# Patient Record
Sex: MALE | Race: WHITE | RURAL
[De-identification: names, ages, dates, MRNs, and addresses within clinical notes are randomized per-mention and may not be internally consistent; named-entity substitution may affect disease eponyms.]

---

## 2023-11-06 ENCOUNTER — APPOINTMENT (OUTPATIENT)
Dept: GENERAL RADIOLOGY | Facility: HOSPITAL | Age: 88
End: 2023-11-06
Attending: EMERGENCY MEDICINE
Payer: OTHER GOVERNMENT

## 2023-11-06 ENCOUNTER — HOSPITAL ENCOUNTER (EMERGENCY)
Facility: HOSPITAL | Age: 88
Discharge: HOME | End: 2023-11-06
Attending: EMERGENCY MEDICINE
Payer: OTHER GOVERNMENT

## 2023-11-06 VITALS
TEMPERATURE: 98.5 F | DIASTOLIC BLOOD PRESSURE: 110 MMHG | HEART RATE: 62 BPM | OXYGEN SATURATION: 99 % | RESPIRATION RATE: 15 BRPM | WEIGHT: 180 LBS | HEIGHT: 69 IN | BODY MASS INDEX: 26.66 KG/M2 | SYSTOLIC BLOOD PRESSURE: 176 MMHG

## 2023-11-06 DIAGNOSIS — F03.90 DEMENTIA, UNSPECIFIED DEMENTIA SEVERITY, UNSPECIFIED DEMENTIA TYPE, UNSPECIFIED WHETHER BEHAVIORAL, PSYCHOTIC, OR MOOD DISTURBANCE OR ANXIETY (HCC): Primary | ICD-10-CM

## 2023-11-06 DIAGNOSIS — M17.12 ARTHRITIS OF LEFT KNEE: ICD-10-CM

## 2023-11-06 PROCEDURE — 96372 THER/PROPH/DIAG INJ SC/IM: CPT

## 2023-11-06 PROCEDURE — 73560 X-RAY EXAM OF KNEE 1 OR 2: CPT

## 2023-11-06 PROCEDURE — 99284 EMERGENCY DEPT VISIT MOD MDM: CPT

## 2023-11-06 PROCEDURE — 6360000002 HC RX W HCPCS: Performed by: EMERGENCY MEDICINE

## 2023-11-06 RX ORDER — KETOROLAC TROMETHAMINE 15 MG/ML
15 INJECTION, SOLUTION INTRAMUSCULAR; INTRAVENOUS ONCE
Status: COMPLETED | OUTPATIENT
Start: 2023-11-06 | End: 2023-11-06

## 2023-11-06 RX ADMIN — KETOROLAC TROMETHAMINE 15 MG: 15 INJECTION, SOLUTION INTRAMUSCULAR; INTRAVENOUS at 17:28

## 2023-11-06 ASSESSMENT — PATIENT HEALTH QUESTIONNAIRE - PHQ9
SUM OF ALL RESPONSES TO PHQ QUESTIONS 1-9: 0
1. LITTLE INTEREST OR PLEASURE IN DOING THINGS: 0
2. FEELING DOWN, DEPRESSED OR HOPELESS: 0
SUM OF ALL RESPONSES TO PHQ9 QUESTIONS 1 & 2: 0
SUM OF ALL RESPONSES TO PHQ QUESTIONS 1-9: 0

## 2023-11-06 ASSESSMENT — LIFESTYLE VARIABLES
HOW MANY STANDARD DRINKS CONTAINING ALCOHOL DO YOU HAVE ON A TYPICAL DAY: PATIENT DOES NOT DRINK
HOW OFTEN DO YOU HAVE A DRINK CONTAINING ALCOHOL: NEVER

## 2023-11-06 ASSESSMENT — PAIN SCALES - GENERAL: PAINLEVEL_OUTOF10: 3

## 2023-11-06 ASSESSMENT — PAIN DESCRIPTION - ORIENTATION: ORIENTATION: RIGHT;LEFT

## 2023-11-06 ASSESSMENT — PAIN - FUNCTIONAL ASSESSMENT: PAIN_FUNCTIONAL_ASSESSMENT: 0-10

## 2023-11-06 ASSESSMENT — PAIN DESCRIPTION - LOCATION: LOCATION: KNEE

## 2023-11-06 NOTE — ED NOTES
Spoke with patients son, Adelaida Carrel, over the phone. He states he acts as his current caretaker, but was not aware that the patient had called EMS until they arrived at their residence. Per the son, the patient is confused at baseline and \"has these spells\" of increased confusion and agitation at times. This afternoon the patient was upset with the son because he would not allow him to drive himself to the [facility] in [place], so the patient called someone to call an ambulance for him. Dr. Toya Day was made aware of the situation and also spoke with the son over the phone. Per Dr. Toya Day, the patient is going to be discharged, with the son coming to pick him up now.       Fco Harper [place]  11/06/23 2261

## 2023-11-06 NOTE — ED PROVIDER NOTES
[facility] ENCOUNTER        Pt Name: Kermit Brantley  MRN: [id number]  [address] [facility] 1934  Date of evaluation: 11/6/2023  Provider: Birgit Pryor MD  PCP: No primary care provider on file. Note Started: 4:29 PM EST 11/6/23    CHIEF COMPLAINT       Chief Complaint   Patient presents with    Eye Problem       HISTORY OF PRESENT ILLNESS: 1 or more Elements     History from : Patient    Limitations to history : None    Kermit Brantley is a 80 y.o. male who presents complaining that he had a left-sided stroke he does not know when but since that time the vision in his left eye has been bed and getting worse he also said that he had had surgery on that left eye and that he called the ambulance to take him back to the [place] to have this checked out he also complains of some left knee pain that he says has been bothering him ever since his stroke. Denies headache he complains of having speech but he speaks quite well around talking to them. Nursing Notes were all reviewed and agreed with or any disagreements were addressed in the HPI. REVIEW OF SYSTEMS :      Review of Systems     systems reviewed and negative except as in HPI/MDM    SURGICAL HISTORY     Past Surgical History:   Procedure Laterality Date    CARDIAC SURGERY      CORONARY ARTERY BYPASS GRAFT      3 vessel    HERNIA REPAIR         CURRENTMEDICATIONS       Previous Medications    No medications on file       ALLERGIES     Patient has no known allergies. FAMILYHISTORY     History reviewed. No pertinent family history.      SOCIAL HISTORY       Social History     Tobacco Use    Smoking status: Never    Smokeless tobacco: Never   Substance Use Topics    Alcohol use: Not Currently       SCREENINGS        [place] Coma Scale  Eye Opening: Spontaneous  Best Verbal Response: Oriented  Best Motor Response: Obeys commands  [place] Coma Scale Score: 15                CIWA Assessment  BP: (!)

## 2023-11-06 NOTE — ED NOTES
Patient reports he requested to be taken to [facility], but [facility] EMS unable to pass closest hospital.      Kayla Aggarwal, [address]  11/06/23 8107

## 2023-11-06 NOTE — ED NOTES
Patients son given discharge information. No further questions or concerns at this time.      Ghada Marshall  11/06/23 9223

## 2023-11-06 NOTE — ED TRIAGE NOTES
Patient presents to the ED via [facility] with c/o worsening visual impairment of the left eye. Patient reports he had a stroke, \"about a month or so ago\", and has had blurry vision in the left since. Patient reports increased blurriness over the past few weeks.